# Patient Record
Sex: MALE | Race: BLACK OR AFRICAN AMERICAN | Employment: FULL TIME | URBAN - METROPOLITAN AREA
[De-identification: names, ages, dates, MRNs, and addresses within clinical notes are randomized per-mention and may not be internally consistent; named-entity substitution may affect disease eponyms.]

---

## 2017-02-24 ENCOUNTER — OFFICE VISIT (OUTPATIENT)
Dept: INTERNAL MEDICINE | Facility: CLINIC | Age: 42
End: 2017-02-24
Payer: COMMERCIAL

## 2017-02-24 VITALS
TEMPERATURE: 97.7 F | WEIGHT: 186 LBS | HEART RATE: 80 BPM | HEIGHT: 70 IN | OXYGEN SATURATION: 99 % | DIASTOLIC BLOOD PRESSURE: 80 MMHG | SYSTOLIC BLOOD PRESSURE: 116 MMHG | BODY MASS INDEX: 26.63 KG/M2

## 2017-02-24 DIAGNOSIS — E11.9 TYPE 2 DIABETES MELLITUS WITHOUT COMPLICATION, WITHOUT LONG-TERM CURRENT USE OF INSULIN (H): ICD-10-CM

## 2017-02-24 DIAGNOSIS — Z00.00 ROUTINE GENERAL MEDICAL EXAMINATION AT A HEALTH CARE FACILITY: Primary | ICD-10-CM

## 2017-02-24 LAB
ALBUMIN UR-MCNC: NEGATIVE MG/DL
APPEARANCE UR: CLEAR
BILIRUB UR QL STRIP: NEGATIVE
COLOR UR AUTO: YELLOW
ERYTHROCYTE [DISTWIDTH] IN BLOOD BY AUTOMATED COUNT: 13 % (ref 10–15)
GLUCOSE UR STRIP-MCNC: NEGATIVE MG/DL
HBA1C MFR BLD: 5.9 % (ref 4.3–6)
HCT VFR BLD AUTO: 43.1 % (ref 40–53)
HGB BLD-MCNC: 14.5 G/DL (ref 13.3–17.7)
HGB UR QL STRIP: NEGATIVE
KETONES UR STRIP-MCNC: NEGATIVE MG/DL
LEUKOCYTE ESTERASE UR QL STRIP: NEGATIVE
MCH RBC QN AUTO: 28.3 PG (ref 26.5–33)
MCHC RBC AUTO-ENTMCNC: 33.6 G/DL (ref 31.5–36.5)
MCV RBC AUTO: 84 FL (ref 78–100)
NITRATE UR QL: NEGATIVE
PH UR STRIP: 5.5 PH (ref 5–7)
PLATELET # BLD AUTO: 153 10E9/L (ref 150–450)
RBC # BLD AUTO: 5.12 10E12/L (ref 4.4–5.9)
SP GR UR STRIP: 1.02 (ref 1–1.03)
URN SPEC COLLECT METH UR: NORMAL
UROBILINOGEN UR STRIP-ACNC: 0.2 EU/DL (ref 0.2–1)
WBC # BLD AUTO: 4.9 10E9/L (ref 4–11)

## 2017-02-24 PROCEDURE — 82043 UR ALBUMIN QUANTITATIVE: CPT | Performed by: INTERNAL MEDICINE

## 2017-02-24 PROCEDURE — 83036 HEMOGLOBIN GLYCOSYLATED A1C: CPT | Performed by: INTERNAL MEDICINE

## 2017-02-24 PROCEDURE — 80061 LIPID PANEL: CPT | Performed by: INTERNAL MEDICINE

## 2017-02-24 PROCEDURE — 80053 COMPREHEN METABOLIC PANEL: CPT | Performed by: INTERNAL MEDICINE

## 2017-02-24 PROCEDURE — 81003 URINALYSIS AUTO W/O SCOPE: CPT | Performed by: INTERNAL MEDICINE

## 2017-02-24 PROCEDURE — 36415 COLL VENOUS BLD VENIPUNCTURE: CPT | Performed by: INTERNAL MEDICINE

## 2017-02-24 PROCEDURE — 99396 PREV VISIT EST AGE 40-64: CPT | Performed by: INTERNAL MEDICINE

## 2017-02-24 PROCEDURE — 84443 ASSAY THYROID STIM HORMONE: CPT | Performed by: INTERNAL MEDICINE

## 2017-02-24 PROCEDURE — G0103 PSA SCREENING: HCPCS | Performed by: INTERNAL MEDICINE

## 2017-02-24 PROCEDURE — 85027 COMPLETE CBC AUTOMATED: CPT | Performed by: INTERNAL MEDICINE

## 2017-02-24 NOTE — PROGRESS NOTES
SUBJECTIVE:     CC: Gt Fregoso is an 41 year old male who presents for preventative health visit.     Healthy Habits:    Do you get at least three servings of calcium containing foods daily (dairy, green leafy vegetables, etc.)? yes    Amount of exercise or daily activities, outside of work: walks    Problems taking medications regularly No    Medication side effects: No    Have you had an eye exam in the past two years? no    Do you see a dentist twice per year? Yes 1-2017    Do you have sleep apnea, excessive snoring or daytime drowsiness?no        PROBLEMS TO ADD ON...  Has H/O DM. On diet , exercise and PO med. Blood sugars are controlled. No parestesias. No hypoglycemias.  Has had more frequent urination for 10 days.     Today's PHQ-2 Score:   PHQ-2 ( 1999 Pfizer) 1/4/2016 7/14/2015   Q1: Little interest or pleasure in doing things 0 0   Q2: Feeling down, depressed or hopeless 0 0   PHQ-2 Score 0 0       Abuse: Current or Past(Physical, Sexual or Emotional)- No  Do you feel safe in your environment - Yes    Social History   Substance Use Topics     Smoking status: Never Smoker     Smokeless tobacco: Never Used     Alcohol use No     The patient does not drink >3 drinks per day nor >7 drinks per week.    Last PSA: No results found for: PSA    Recent Labs   Lab Test  04/24/15   1411  07/22/13   1131   CHOL  107  116   HDL  38*  30*   LDL  54  68   TRIG  74  86   CHOLHDLRATIO  2.8  3.8       Reviewed orders with patient. Reviewed health maintenance and updated orders accordingly - Yes    All Histories reviewed and updated in Epic.      ROS:  C: NEGATIVE for fever, chills, change in weight  I: NEGATIVE for worrisome rashes, moles or lesions  E: NEGATIVE for vision changes or irritation  ENT: NEGATIVE for ear, mouth and throat problems  R: NEGATIVE for significant cough or SOB  CV: NEGATIVE for chest pain, palpitations or peripheral edema  GI: NEGATIVE for nausea, abdominal pain, heartburn, or change in bowel  "habits   male: negative for dysuria, hematuria, decreased urinary stream, erectile dysfunction, urethral discharge  M: NEGATIVE for significant arthralgias or myalgia  N: NEGATIVE for weakness, dizziness or paresthesias  P: NEGATIVE for changes in mood or affect    Problem list, Medication list, Allergies, and Medical/Social/Surgical histories reviewed in [facility] and updated as appropriate.  OBJECTIVE:     /80 (BP Location: Right arm, Patient Position: Chair, Cuff Size: Adult Large)  Pulse 80  Temp 97.7  F (36.5  C) (Oral)  Ht 5' 9.5\" (1.765 m)  Wt 186 lb (84.4 kg)  SpO2 99%  BMI 27.07 kg/m2  EXAM:  GENERAL: healthy, alert and no distress  EYES: Eyes grossly normal to inspection, PERRL and conjunctivae and sclerae normal  HENT: ear canals and TM's normal, nose and mouth without ulcers or lesions  NECK: no adenopathy, no asymmetry, masses, or scars and thyroid normal to palpation  RESP: lungs clear to auscultation - no rales, rhonchi or wheezes  CV: regular rate and rhythm, normal S1 S2, no S3 or S4, no murmur, click or rub, no peripheral edema and peripheral pulses strong  ABDOMEN: soft, nontender, no hepatosplenomegaly, no masses and bowel sounds normal  MS: no gross musculoskeletal defects noted, no edema  SKIN: no suspicious lesions or rashes  NEURO: Normal strength and tone, mentation intact and speech normal  PSYCH: mentation appears normal, affect normal/bright    ASSESSMENT/PLAN:         ICD-10-CM    1. Routine general medical examination at a health care facility Z00.00 Hemoglobin A1c     Lipid panel reflex to direct LDL     Comprehensive metabolic panel     TSH with free T4 reflex     CBC with platelets     *UA reflex to Microscopic and Culture ([facility] (except [place] and [place])     Prostate spec antigen screen     Albumin Random Urine Quantitative   2. Type 2 diabetes mellitus without complication, without long-term current use of insulin (H) E11.9 " "SitaGLIPtin-MetFORMIN HCl (JANUMET XR)  MG TB24     Hemoglobin A1c     Lipid panel reflex to direct LDL     Comprehensive metabolic panel     TSH with free T4 reflex     CBC with platelets     *UA reflex to Microscopic and Culture ([facility] (except [place] and [place])     Albumin Random Urine Quantitative       COUNSELING:  Reviewed preventive health counseling, as reflected in patient instructions       Regular exercise       Healthy diet/nutrition       Vision screening       Hearing screening       Colon cancer screening       Prostate cancer screening         reports that he has never smoked. He has never used smokeless tobacco.    Estimated body mass index is 27.07 kg/(m^2) as calculated from the following:    Height as of this encounter: 5' 9.5\" (1.765 m).    Weight as of this encounter: 186 lb (84.4 kg).       Counseling Resources:  ATP IV Guidelines  Pooled Cohorts Equation Calculator  FRAX Risk Assessment  ICSI Preventive Guidelines  Dietary Guidelines for Americans, 2010  USDA's MyPlate  ASA Prophylaxis  Lung CA Screening    El Simmons MD  [facility]  "

## 2017-02-24 NOTE — NURSING NOTE
"Chief Complaint   Patient presents with     Physical     fasting       Initial /80 (BP Location: Right arm, Patient Position: Chair, Cuff Size: Adult Large)  Pulse 80  Temp 97.7  F (36.5  C) (Oral)  Ht 5' 9.5\" (1.765 m)  Wt 186 lb (84.4 kg)  SpO2 99%  BMI 27.07 kg/m2 Estimated body mass index is 27.07 kg/(m^2) as calculated from the following:    Height as of this encounter: 5' 9.5\" (1.765 m).    Weight as of this encounter: 186 lb (84.4 kg).  Medication Reconciliation: complete    "

## 2017-02-24 NOTE — MR AVS SNAPSHOT
After Visit Summary   2/24/2017 Sunday NARESH Fregoso    MRN: [id number]           Patient Information     Date Of Birth          1975        Visit Information        Provider Department      2/24/2017 10:00 AM El Simmons MD [facility]        Today's Diagnoses     Routine general medical examination at a health care facility    -  1    Type 2 diabetes mellitus without complication, without long-term current use of insulin (H)          Care Instructions      Preventive Health Recommendations  Male Ages 40 to 49    Yearly exam:             See your health care provider every year in order to  o   Review health changes.   o   Discuss preventive care.    o   Review your medicines if your doctor has prescribed any.    You should be tested each year for STDs (sexually transmitted diseases) if you re at risk.     Have a cholesterol test every 5 years.     Have a colonoscopy (test for colon cancer) if someone in your family has had colon cancer or polyps before age 50.     After age 45, have a diabetes test (fasting glucose). If you are at risk for diabetes, you should have this test every 3 years.      Talk with your health care provider about whether or not a prostate cancer screening test (PSA) is right for you.    Shots: Get a flu shot each year. Get a tetanus shot every 10 years.     Nutrition:    Eat at least 5 servings of fruits and vegetables daily.     Eat whole-grain bread, whole-wheat pasta and brown rice instead of white grains and rice.     Talk to your provider about Calcium and Vitamin D.     Lifestyle    Exercise for at least 150 minutes a week (30 minutes a day, 5 days a week). This will help you control your weight and prevent disease.     Limit alcohol to one drink per day.     No smoking.     Wear sunscreen to prevent skin cancer.     See your dentist every six months for an exam and cleaning.            Follow-ups after your visit        Who to contact     If  "you have questions or need follow up information about today's clinic visit or your schedule please contact [facility] directly at [phone].  Normal or non-critical lab and imaging results will be communicated to you by MyChart, letter or phone within 4 business days after the clinic has received the results. If you do not hear from us within 7 days, please contact the clinic through [employer] or phone. If you have a critical or abnormal lab result, we will notify you by phone as soon as possible.  Submit refill requests through [email or web address] or call your pharmacy and they will forward the refill request to us. Please allow 3 business days for your refill to be completed.          Additional Information About Your Visit        [employer] Information     [email or web address] lets you send messages to your doctor, view your test results, renew your prescriptions, schedule appointments and more. To sign up, go to [email or web address] . Click on \"Log in\" on the left side of the screen, which will take you to the Welcome page. Then click on \"Sign up Now\" on the right side of the page.     You will be asked to enter the access code listed below, as well as some personal information. Please follow the directions to create your username and password.     Your access code is: [email or web address]  Expires: 2017 10:23 AM     Your access code will  in 90 days. If you need help or a new code, please call your [place] clinic or [phone].        Care EveryWhere ID     This is your Care EveryWhere ID. This could be used by other organizations to access your [place] medical records  [id number]        Your Vitals Were     Pulse Temperature Height Pulse Oximetry BMI (Body Mass Index)       80 97.7  F (36.5  C) (Oral) 5' 9.5\" (1.765 m) 99% 27.07 kg/m2        Blood Pressure from Last 3 Encounters:   17 116/80   16 124/60   07/14/15 118/72    Weight from Last 3 Encounters:   17 186 lb (84.4 kg)   16 " 195 lb (88.5 kg)   07/14/15 190 lb 9.6 oz (86.5 kg)              We Performed the Following     *UA reflex to Microscopic and Culture ([facility] and [facility] (except [place] and [place])     Albumin Random Urine Quantitative     CBC with platelets     Comprehensive metabolic panel     Hemoglobin A1c     Lipid panel reflex to direct LDL     Prostate spec antigen screen     TSH with free T4 reflex          Where to get your medicines      These medications were sent to [facility] [place], [place] - [address]  [address] [facility] [address]     Phone:  [phone]     SitaGLIPtin-MetFORMIN HCl  MG Tb24          Primary Care Provider Office Phone # Fax #    El Simmons [phone]       [facility] [address] [facility] [address]        Thank you!     Thank you for choosing [facility]  for your care. Our goal is always to provide you with excellent care. Hearing back from our patients is one way we can continue to improve our services. Please take a few minutes to complete the written survey that you may receive in the mail after your visit with us. Thank you!             Your Updated Medication List - Protect others around you: Learn how to safely use, store and throw away your medicines at www.disposemymeds.org.          This list is accurate as of: 2/24/17 10:23 AM.  Always use your most recent med list.                   Brand Name Dispense Instructions for use    * ACE NOT PRESCRIBED (INTENTIONAL)     0 each    1 each daily ACE Inhibitor not prescribed due to Other:not needed       * ASPIRIN NOT PRESCRIBED    INTENTIONAL    0 each    1 each continuous prn. Antiplatelet medication not prescribed intentionally due to Intolerance and GI Issues / Ulcer Disease       SitaGLIPtin-MetFORMIN HCl  MG Tb24    JANUMET XR    180 tablet    Take 1 tablet by mouth 2 times daily       * STATIN NOT  PRESCRIBED (INTENTIONAL)     0 each    Statin not prescribed intentionally due to Other:not needed       * Notice:  This list has 3 medication(s) that are the same as other medications prescribed for you. Read the directions carefully, and ask your doctor or other care provider to review them with you.

## 2017-02-25 LAB
ALBUMIN SERPL-MCNC: 4.4 G/DL (ref 3.4–5)
ALP SERPL-CCNC: 53 U/L (ref 40–150)
ALT SERPL W P-5'-P-CCNC: 35 U/L (ref 0–70)
ANION GAP SERPL CALCULATED.3IONS-SCNC: 4 MMOL/L (ref 3–14)
AST SERPL W P-5'-P-CCNC: 25 U/L (ref 0–45)
BILIRUB SERPL-MCNC: 0.6 MG/DL (ref 0.2–1.3)
BUN SERPL-MCNC: 16 MG/DL (ref 7–30)
CALCIUM SERPL-MCNC: 9.5 MG/DL (ref 8.5–10.1)
CHLORIDE SERPL-SCNC: 104 MMOL/L (ref 94–109)
CHOLEST SERPL-MCNC: 149 MG/DL
CO2 SERPL-SCNC: 33 MMOL/L (ref 20–32)
CREAT SERPL-MCNC: 0.96 MG/DL (ref 0.66–1.25)
CREAT UR-MCNC: 163 MG/DL
GFR SERPL CREATININE-BSD FRML MDRD: 86 ML/MIN/1.7M2
GLUCOSE SERPL-MCNC: 109 MG/DL (ref 70–99)
HDLC SERPL-MCNC: 40 MG/DL
LDLC SERPL CALC-MCNC: 89 MG/DL
MICROALBUMIN UR-MCNC: 5 MG/L
MICROALBUMIN/CREAT UR: 3.32 MG/G CR (ref 0–17)
NONHDLC SERPL-MCNC: 109 MG/DL
POTASSIUM SERPL-SCNC: 4.3 MMOL/L (ref 3.4–5.3)
PROT SERPL-MCNC: 8.1 G/DL (ref 6.8–8.8)
PSA SERPL-ACNC: 2.48 UG/L (ref 0–4)
SODIUM SERPL-SCNC: 141 MMOL/L (ref 133–144)
TRIGL SERPL-MCNC: 100 MG/DL
TSH SERPL DL<=0.005 MIU/L-ACNC: 2.94 MU/L (ref 0.4–4)

## 2017-03-24 ENCOUNTER — OFFICE VISIT (OUTPATIENT)
Dept: INTERNAL MEDICINE | Facility: CLINIC | Age: 42
End: 2017-03-24
Payer: COMMERCIAL

## 2017-03-24 VITALS
TEMPERATURE: 97.9 F | HEART RATE: 73 BPM | BODY MASS INDEX: 26.5 KG/M2 | HEIGHT: 70 IN | WEIGHT: 185.1 LBS | DIASTOLIC BLOOD PRESSURE: 78 MMHG | SYSTOLIC BLOOD PRESSURE: 118 MMHG | RESPIRATION RATE: 16 BRPM | OXYGEN SATURATION: 99 %

## 2017-03-24 DIAGNOSIS — L30.9 DERMATITIS: Primary | ICD-10-CM

## 2017-03-24 PROCEDURE — 99213 OFFICE O/P EST LOW 20 MIN: CPT | Performed by: INTERNAL MEDICINE

## 2017-03-24 RX ORDER — TRIAMCINOLONE ACETONIDE 1 MG/G
CREAM TOPICAL
Qty: 15 G | Refills: 3 | Status: SHIPPED | OUTPATIENT
Start: 2017-03-24 | End: 2018-10-24

## 2017-03-24 NOTE — NURSING NOTE
"Chief Complaint   Patient presents with     Fungal Infection     Left big toe- scaly, itchy, on and off for 6 months, last episode lasting 1 month. Used OTC cream's not effective.        Initial /78  Pulse 73  Temp 97.9  F (36.6  C) (Oral)  Resp 16  Ht 5' 9.5\" (1.765 m)  Wt 185 lb 1.6 oz (84 kg)  SpO2 99%  BMI 26.94 kg/m2 Estimated body mass index is 26.94 kg/(m^2) as calculated from the following:    Height as of this encounter: 5' 9.5\" (1.765 m).    Weight as of this encounter: 185 lb 1.6 oz (84 kg).  Medication Reconciliation: complete      Racquel Dinero CMA      "

## 2017-03-24 NOTE — MR AVS SNAPSHOT
"              After Visit Summary   3/24/2017    Gt NARESH Fregoso    MRN: [id number]           Patient Information     Date Of Birth          1975        Visit Information        Provider Department      3/24/2017 11:00 AM Caitlyn Gonzalez MD [facility]        Today's Diagnoses     Dermatitis    -  1       Follow-ups after your visit        Who to contact     If you have questions or need follow up information about today's clinic visit or your schedule please contact [facility] directly at [phone].  Normal or non-critical lab and imaging results will be communicated to you by MyChart, letter or phone within 4 business days after the clinic has received the results. If you do not hear from us within 7 days, please contact the clinic through [employer] or phone. If you have a critical or abnormal lab result, we will notify you by phone as soon as possible.  Submit refill requests through [employer] or call your pharmacy and they will forward the refill request to us. Please allow 3 business days for your refill to be completed.          Additional Information About Your Visit        MyChart Information     [employer] lets you send messages to your doctor, view your test results, renew your prescriptions, schedule appointments and more. To sign up, go to [email or web address] . Click on \"Log in\" on the left side of the screen, which will take you to the Welcome page. Then click on \"Sign up Now\" on the right side of the page.     You will be asked to enter the access code listed below, as well as some personal information. Please follow the directions to create your username and password.     Your access code is: [email or web address]  Expires: 2017 11:23 AM     Your access code will  in 90 days. If you need help or a new code, please call your [facility] or [phone].        Care EveryWhere ID     This is your Care EveryWhere ID. This could be used by other organizations to " "access your [place] medical records  [id number]        Your Vitals Were     Pulse Temperature Respirations Height Pulse Oximetry BMI (Body Mass Index)    73 97.9  F (36.6  C) (Oral) 16 5' 9.5\" (1.765 m) 99% 26.94 kg/m2       Blood Pressure from Last 3 Encounters:   03/24/17 118/78   02/24/17 116/80   01/04/16 124/60    Weight from Last 3 Encounters:   03/24/17 185 lb 1.6 oz (84 kg)   02/24/17 186 lb (84.4 kg)   01/04/16 195 lb (88.5 kg)              Today, you had the following     No orders found for display         Today's Medication Changes          These changes are accurate as of: 3/24/17  3:16 PM.  If you have any questions, ask your nurse or doctor.               Start taking these medicines.        Dose/Directions    triamcinolone 0.1 % cream   Commonly known as:  KENALOG   Used for:  Dermatitis   Started by:  Caitlyn Gonzalez MD        Apply sparingly to affected area bid as needed   Quantity:  15 g   Refills:  3            Where to get your medicines      These medications were sent to [facility] [place], [place] - [facility].  [facility] [address] [facility] [id number]     Phone:  [phone]     triamcinolone 0.1 % cream                Primary Care Provider Office Phone # Fax #    El Simmons [phone]       [place] [address] [facility] [address]        Thank you!     Thank you for choosing [facility]  for your care. Our goal is always to provide you with excellent care. Hearing back from our patients is one way we can continue to improve our services. Please take a few minutes to complete the written survey that you may receive in the mail after your visit with us. Thank you!             Your Updated Medication List - Protect others around you: Learn how to safely use, store and throw away your medicines at www.disposemymeds.org.          This list is accurate as of: 3/24/17  3:16 PM.  Always use your most " recent med list.                   Brand Name Dispense Instructions for use    * ACE NOT PRESCRIBED (INTENTIONAL)     0 each    1 each daily ACE Inhibitor not prescribed due to Other:not needed       * ASPIRIN NOT PRESCRIBED    INTENTIONAL    0 each    1 each continuous prn. Antiplatelet medication not prescribed intentionally due to Intolerance and GI Issues / Ulcer Disease       SitaGLIPtin-MetFORMIN HCl  MG Tb24    JANUMET XR    180 tablet    Take 1 tablet by mouth 2 times daily       * STATIN NOT PRESCRIBED (INTENTIONAL)     0 each    Statin not prescribed intentionally due to Other:not needed       triamcinolone 0.1 % cream    KENALOG    15 g    Apply sparingly to affected area bid as needed       * Notice:  This list has 3 medication(s) that are the same as other medications prescribed for you. Read the directions carefully, and ask your doctor or other care provider to review them with you.

## 2017-03-24 NOTE — PROGRESS NOTES
"    SUBJECTIVE:                                                    Gt Fregoso is a 41 year old male who presents to clinic today for the following health issues:      Rash with itching, mild soreness left great toe medially. It is along the nail. There has been no swelling or drainage. It is not really painful. He has tried several fungal creams without relief.       Patient Active Problem List   Diagnosis     PUD (Peptic Ulcer Disease)-hx of     GERD (gastroesophageal reflux disease)     Helicobacter pylori infection     Positive PPD     Anemia     CARDIOVASCULAR SCREENING; LDL GOAL LESS THAN 160     Type 2 diabetes mellitus without complication (H)     Current Outpatient Prescriptions   Medication Sig Dispense Refill     SitaGLIPtin-MetFORMIN HCl (JANUMET XR)  MG TB24 Take 1 tablet by mouth 2 times daily 180 tablet 3     ACE NOT PRESCRIBED, INTENTIONAL, 1 each daily ACE Inhibitor not prescribed due to Other:not needed 0 each 0     STATIN NOT PRESCRIBED, INTENTIONAL, Statin not prescribed intentionally due to Other:not needed 0 each 0     ASPIRIN NOT PRESCRIBED, INTENTIONAL, 1 each continuous prn. Antiplatelet medication not prescribed intentionally due to Intolerance and GI Issues / Ulcer Disease 0 each 0        Reviewed and updated as needed this visit by clinical staff  Tobacco  Allergies  Meds  Med Hx  Surg Hx  Fam Hx  Soc Hx      Reviewed and updated as needed this visit by Provider         ROS:  negative    OBJECTIVE:                                                    /78  Pulse 73  Temp 97.9  F (36.6  C) (Oral)  Resp 16  Ht 5' 9.5\" (1.765 m)  Wt 185 lb 1.6 oz (84 kg)  SpO2 99%  BMI 26.94 kg/m2  Body mass index is 26.94 kg/(m^2).    There is some scaling and mild erythema medial distal left great toe.   No other rash between toes       ASSESSMENT/PLAN:                                                            1. Dermatitis  No evidence of infection, failed appropriate trial of " antifungal so more likely dermatitis. Trial TMC, refer derm if worsening.   - triamcinolone (KENALOG) 0.1 % cream; Apply sparingly to affected area bid as needed  Dispense: 15 g; Refill: 3        Caitlyn Gonzalez MD  [facility]

## 2017-08-04 ENCOUNTER — TELEPHONE (OUTPATIENT)
Dept: INTERNAL MEDICINE | Facility: CLINIC | Age: 42
End: 2017-08-04

## 2017-08-04 DIAGNOSIS — E11.9 TYPE 2 DIABETES MELLITUS WITHOUT COMPLICATION, WITHOUT LONG-TERM CURRENT USE OF INSULIN (H): Primary | ICD-10-CM

## 2017-08-04 RX ORDER — METFORMIN HCL 500 MG
1000 TABLET, EXTENDED RELEASE 24 HR ORAL
Qty: 180 TABLET | Refills: 1 | Status: SHIPPED | OUTPATIENT
Start: 2017-08-04 | End: 2018-01-05

## 2017-08-04 NOTE — TELEPHONE ENCOUNTER
Call to pt. He states he will NEVER go back to Metformin alone.   He states the metformin combined with the other medication works to control his BS.     He wants to wait for the PA.     Informed him that this will be started on Monday. He agrees.     Dr Simmons, are there any other alternatives prior to starting PA?

## 2017-08-04 NOTE — TELEPHONE ENCOUNTER
Janumet xr 50-500mg needs a prior auth, prerequisite therapy required. Please call [phone]. The insurance is [employer]) and his ID# IS [id number]. We advanced him 4 days worth as he was out of medicine. Thanks Chinyere on Behalf of [facility].

## 2017-08-04 NOTE — TELEPHONE ENCOUNTER
He can try Metformin alone without the Januvia. Will call in.,   Needs recheck in 3 months of his HgbA1c.

## 2017-08-07 NOTE — TELEPHONE ENCOUNTER
Call to below number. Transferred to [employer] at [phone]. Transferred to mail order pharmacy. They are not able to help me. Call to below number again. PA was already completed earlier today. PA was approved through 8/7/18. Call to pharmacy. Updated.

## 2017-08-07 NOTE — TELEPHONE ENCOUNTER
Pt has current rx at pharmacy. Pharmacy is aware PA was approved. New order entered as historical so med list is up to date.

## 2017-08-07 NOTE — TELEPHONE ENCOUNTER
Called to insurance, PA done over the phone for Janumet Xr  and was approved. Insurance will send a fax confirming but stating to give and hour to update Pt's insurance before running Rx through pharmacy. Will call Pt and pharmacy to inform of approval. Please, send in new script.   Yadira Bravo MA

## 2018-01-05 ENCOUNTER — OFFICE VISIT (OUTPATIENT)
Dept: INTERNAL MEDICINE | Facility: CLINIC | Age: 43
End: 2018-01-05
Payer: COMMERCIAL

## 2018-01-05 VITALS
TEMPERATURE: 98.1 F | DIASTOLIC BLOOD PRESSURE: 78 MMHG | OXYGEN SATURATION: 99 % | HEIGHT: 70 IN | BODY MASS INDEX: 27.49 KG/M2 | WEIGHT: 192 LBS | SYSTOLIC BLOOD PRESSURE: 120 MMHG | HEART RATE: 79 BPM

## 2018-01-05 DIAGNOSIS — E11.9 TYPE 2 DIABETES MELLITUS WITHOUT COMPLICATION, WITHOUT LONG-TERM CURRENT USE OF INSULIN (H): Primary | ICD-10-CM

## 2018-01-05 DIAGNOSIS — Z00.00 ROUTINE GENERAL MEDICAL EXAMINATION AT A HEALTH CARE FACILITY: ICD-10-CM

## 2018-01-05 DIAGNOSIS — Z11.1 SCREENING EXAMINATION FOR PULMONARY TUBERCULOSIS: ICD-10-CM

## 2018-01-05 DIAGNOSIS — K21.9 GASTROESOPHAGEAL REFLUX DISEASE WITHOUT ESOPHAGITIS: ICD-10-CM

## 2018-01-05 LAB
ERYTHROCYTE [DISTWIDTH] IN BLOOD BY AUTOMATED COUNT: 12.6 % (ref 10–15)
HBA1C MFR BLD: 5.2 % (ref 4.3–6)
HCT VFR BLD AUTO: 43.4 % (ref 40–53)
HGB BLD-MCNC: 14.5 G/DL (ref 13.3–17.7)
MCH RBC QN AUTO: 28.6 PG (ref 26.5–33)
MCHC RBC AUTO-ENTMCNC: 33.4 G/DL (ref 31.5–36.5)
MCV RBC AUTO: 86 FL (ref 78–100)
PLATELET # BLD AUTO: 139 10E9/L (ref 150–450)
RBC # BLD AUTO: 5.07 10E12/L (ref 4.4–5.9)
WBC # BLD AUTO: 4 10E9/L (ref 4–11)

## 2018-01-05 PROCEDURE — 86480 TB TEST CELL IMMUN MEASURE: CPT | Performed by: INTERNAL MEDICINE

## 2018-01-05 PROCEDURE — 84443 ASSAY THYROID STIM HORMONE: CPT | Performed by: INTERNAL MEDICINE

## 2018-01-05 PROCEDURE — 83036 HEMOGLOBIN GLYCOSYLATED A1C: CPT | Performed by: INTERNAL MEDICINE

## 2018-01-05 PROCEDURE — 80061 LIPID PANEL: CPT | Performed by: INTERNAL MEDICINE

## 2018-01-05 PROCEDURE — 99214 OFFICE O/P EST MOD 30 MIN: CPT | Performed by: INTERNAL MEDICINE

## 2018-01-05 PROCEDURE — 36415 COLL VENOUS BLD VENIPUNCTURE: CPT | Performed by: INTERNAL MEDICINE

## 2018-01-05 PROCEDURE — 82043 UR ALBUMIN QUANTITATIVE: CPT | Performed by: INTERNAL MEDICINE

## 2018-01-05 PROCEDURE — 80053 COMPREHEN METABOLIC PANEL: CPT | Performed by: INTERNAL MEDICINE

## 2018-01-05 PROCEDURE — 85027 COMPLETE CBC AUTOMATED: CPT | Performed by: INTERNAL MEDICINE

## 2018-01-05 RX ORDER — METFORMIN HCL 500 MG
1000 TABLET, EXTENDED RELEASE 24 HR ORAL
Qty: 180 TABLET | Refills: 1 | Status: CANCELLED | OUTPATIENT
Start: 2018-01-05

## 2018-01-05 NOTE — LETTER
[facility]  [address], [address]  [place] [address]  [phone]        January 9, 2018 Sunday NARESH Fregoso  [address] [facility] [address]            Dear  Gtmary lou Fregoso:      The results of your recent labs were NORMAL.  If you have any further questions or problems, please contact our office.    Sincerely,        El Simmons M.D.

## 2018-01-05 NOTE — MR AVS SNAPSHOT
After Visit Summary   1/5/2018    Gt Fregoso    MRN: [id number]           Patient Information     Date Of Birth          1975        Visit Information        Provider Department      1/5/2018 11:00 AM El Simmons MD [facility]        Today's Diagnoses     Type 2 diabetes mellitus without complication, without long-term current use of insulin (H)    -  1    Screening examination for pulmonary tuberculosis        Routine general medical examination at a health care facility           Follow-ups after your visit        Additional Services     OPHTHALMOLOGY ADULT REFERRAL       Your provider has referred you to: N: [facility]  [phone]  [email or web address]    Please be aware that coverage of these services is subject to the terms and limitations of your health insurance plan.  Call member services at your health plan with any benefit or coverage questions.      Please bring the following with you to your appointment:    (1) Any X-Rays, CTs or MRIs which have been performed.  Contact the facility where they were done to arrange for  prior to your scheduled appointment.    (2) List of current medications  (3) This referral request   (4) Any documents/labs given to you for this referral                  Who to contact     If you have questions or need follow up information about today's clinic visit or your schedule please contact [facility] directly at [phone].  Normal or non-critical lab and imaging results will be communicated to you by MyChart, letter or phone within 4 business days after the clinic has received the results. If you do not hear from us within 7 days, please contact the clinic through MyChart or phone. If you have a critical or abnormal lab result, we will notify you by phone as soon as possible.  Submit refill requests through [employer] or call your pharmacy and they will  "forward the refill request to us. Please allow 3 business days for your refill to be completed.          Additional Information About Your Visit        [employer] Information     [employer] lets you send messages to your doctor, view your test results, renew your prescriptions, schedule appointments and more. To sign up, go to [email or web address] . Click on \"Log in\" on the left side of the screen, which will take you to the Welcome page. Then click on \"Sign up Now\" on the right side of the page.     You will be asked to enter the access code listed below, as well as some personal information. Please follow the directions to create your username and password.     Your access code is: [email or web address]  Expires: 2018 11:43 AM     Your access code will  in 90 days. If you need help or a new code, please call your [place] clinic or [phone].        Care EveryWhere ID     This is your Care EveryWhere ID. This could be used by other organizations to access your [place] medical records  [id number]        Your Vitals Were     Pulse Temperature Height Pulse Oximetry BMI (Body Mass Index)       79 98.1  F (36.7  C) (Oral) 5' 9.5\" (1.765 m) 99% 27.95 kg/m2        Blood Pressure from Last 3 Encounters:   18 120/78   17 118/78   17 116/80    Weight from Last 3 Encounters:   18 192 lb (87.1 kg)   17 185 lb 1.6 oz (84 kg)   17 186 lb (84.4 kg)              We Performed the Following     Albumin Random Urine Quantitative with Creat Ratio     CBC with platelets     Comprehensive metabolic panel     Hemoglobin A1c     Lipid panel reflex to direct LDL Fasting     M Tuberculosis by Quantiferon     OPHTHALMOLOGY ADULT REFERRAL     TSH with free T4 reflex          Where to get your medicines      These medications were sent to [facility] [address] [place] [employer] - [facility] [id number] [facility], [facility] [place] [address]     Phone:  [phone]     SitaGLIPtin-MetFORMIN HCl "  MG Tb24          Primary Care Provider Office Phone # Fax #    El Simmons [phone]       [address] [facility] [address]        Equal Access to Services     JONATHON FLOOD : Claudio dubon emmanuelle kongo Soadolfoali, waaxda luqadaha, qaybta kaalmada adeegyada, kris zhoun malick barrett laDayannalucio gaona. So [facility] [phone].    ATENCIÓN: Si habla español, tiene a barrios disposición servicios gratuitos de asistencia lingüística. Llame al [phone].    We comply with applicable federal civil rights laws and [place] laws. We do not discriminate on the basis of race, color, national origin, age, disability, sex, sexual orientation, or gender identity.            Thank you!     Thank you for choosing [facility]  for your care. Our goal is always to provide you with excellent care. Hearing back from our patients is one way we can continue to improve our services. Please take a few minutes to complete the written survey that you may receive in the mail after your visit with us. Thank you!             Your Updated Medication List - Protect others around you: Learn how to safely use, store and throw away your medicines at www.disposemymeds.org.          This list is accurate as of: 1/5/18 11:43 AM.  Always use your most recent med list.                   Brand Name Dispense Instructions for use Diagnosis    * ACE NOT PRESCRIBED (INTENTIONAL)     0 each    1 each daily ACE Inhibitor not prescribed due to Other:not needed    Diabetes mellitus, type 2 (H)       * ASPIRIN NOT PRESCRIBED    INTENTIONAL    0 each    1 each continuous prn. Antiplatelet medication not prescribed intentionally due to Intolerance and GI Issues / Ulcer Disease    Type 2 diabetes, HbA1C goal < 8% (H)       metFORMIN 500 MG 24 hr tablet    GLUCOPHAGE-XR    180 tablet    Take 2 tablets (1,000 mg) by mouth daily (with dinner)    Type 2 diabetes mellitus without complication, without long-term current use of  insulin (H)       SitaGLIPtin-MetFORMIN HCl  MG Tb24    JANUMET XR    180 tablet    Take 1 tablet by mouth 2 times daily    Type 2 diabetes mellitus without complication, without long-term current use of insulin (H)       * STATIN NOT PRESCRIBED (INTENTIONAL)     0 each    Statin not prescribed intentionally due to Other:not needed        triamcinolone 0.1 % cream    KENALOG    15 g    Apply sparingly to affected area bid as needed    Dermatitis       * Notice:  This list has 3 medication(s) that are the same as other medications prescribed for you. Read the directions carefully, and ask your doctor or other care provider to review them with you.

## 2018-01-05 NOTE — NURSING NOTE
"Chief Complaint   Patient presents with     Diabetes     F/U       Initial /78  Pulse 79  Temp 98.1  F (36.7  C) (Oral)  Ht 5' 9.5\" (1.765 m)  Wt 192 lb (87.1 kg)  SpO2 99%  BMI 27.95 kg/m2 Estimated body mass index is 27.95 kg/(m^2) as calculated from the following:    Height as of this encounter: 5' 9.5\" (1.765 m).    Weight as of this encounter: 192 lb (87.1 kg).  Medication Reconciliation: complete   Yadira Bravo MA      "

## 2018-01-05 NOTE — PROGRESS NOTES
SUBJECTIVE:   Gt Fregoso is a 42 year old male who presents to clinic today for the following health issues:      Diabetes Follow-up    Patient is checking blood sugars: twice daily.    Results are as follows:      AM and evenin-99        Diabetic concerns: None     Symptoms of hypoglycemia (low blood sugar): none     Paresthesias (numbness or burning in feet) or sores: No     Date of last diabetic eye exam: overdue  BP Readings from Last 2 Encounters:   17 118/78   17 116/80     Hemoglobin A1C (%)   Date Value   2017 5.9   2016 5.9     LDL Cholesterol Calculated (mg/dL)   Date Value   2017 89   2015 54   Has H/O DM. On diet , exercise and PO medications. Blood sugars are controlled. No parestesias. No hypoglycemias.  Has h/o GERD on diet. symptoms are controlled. No nausea, vomiting, heartburns, bloating.  Discussed preventive measures.   Has h/o BCG with positive PPD. Has had some mild night sweats. No cough, no weight loss.       Amount of exercise or physical activity: 3 times a week    Problems taking medications regularly: No    Medication side effects: none    Diet: diabetic and carbohydrate counting            Problem list and histories reviewed & adjusted, as indicated.  Additional history: as documented    Patient Active Problem List   Diagnosis     PUD (Peptic Ulcer Disease)-hx of     GERD (gastroesophageal reflux disease)     Helicobacter pylori infection     Positive PPD     Anemia     CARDIOVASCULAR SCREENING; LDL GOAL LESS THAN 160     Type 2 diabetes mellitus without complication (H)     Past Surgical History:   Procedure Laterality Date     C NONSPECIFIC PROCEDURE      none     COLONOSCOPY  2009       Social History   Substance Use Topics     Smoking status: Never Smoker     Smokeless tobacco: Never Used     Alcohol use No     Family History   Problem Relation Age of Onset     DIABETES Mother      Hypertension Father          Current Outpatient  "Prescriptions   Medication Sig Dispense Refill     SitaGLIPtin-MetFORMIN HCl (JANUMET XR)  MG TB24 Take 1 tablet by mouth 2 times daily 180 tablet 3     triamcinolone (KENALOG) 0.1 % cream Apply sparingly to affected area bid as needed 15 g 3     [DISCONTINUED] SitaGLIPtin-MetFORMIN HCl (JANUMET XR)  MG TB24 Take 1 tablet by mouth 2 times daily 180 tablet 3     ACE NOT PRESCRIBED, INTENTIONAL, 1 each daily ACE Inhibitor not prescribed due to Other:not needed 0 each 0     STATIN NOT PRESCRIBED, INTENTIONAL, Statin not prescribed intentionally due to Other:not needed 0 each 0     ASPIRIN NOT PRESCRIBED, INTENTIONAL, 1 each continuous prn. Antiplatelet medication not prescribed intentionally due to Intolerance and GI Issues / Ulcer Disease 0 each 0     No Known Allergies      Reviewed and updated as needed this visit by clinical staff     Reviewed and updated as needed this visit by Provider         ROS:  Constitutional, HEENT, cardiovascular, pulmonary, gi and gu systems are negative, except as otherwise noted.      OBJECTIVE:   /78  Pulse 79  Temp 98.1  F (36.7  C) (Oral)  Ht 5' 9.5\" (1.765 m)  Wt 192 lb (87.1 kg)  SpO2 99%  BMI 27.95 kg/m2  Body mass index is 27.95 kg/(m^2).   GENERAL: healthy, alert and no distress  NECK: no adenopathy, no asymmetry, masses, or scars and thyroid normal to palpation  RESP: lungs clear to auscultation - no rales, rhonchi or wheezes  CV: regular rate and rhythm, normal S1 S2, no S3 or S4, no murmur, click or rub, no peripheral edema and peripheral pulses strong  ABDOMEN: soft, nontender, no hepatosplenomegaly, no masses and bowel sounds normal  MS: no gross musculoskeletal defects noted, no edema    Diagnostic Test Results:  none     ASSESSMENT/PLAN:     Problem List Items Addressed This Visit     GERD (gastroesophageal reflux disease)    Type 2 diabetes mellitus without complication (H) - Primary    Relevant Medications    SitaGLIPtin-MetFORMIN HCl (JANUMET " XR)  MG TB24    Other Relevant Orders    OPHTHALMOLOGY ADULT REFERRAL    CBC with platelets (Completed)    Comprehensive metabolic panel (Completed)    Lipid panel reflex to direct LDL Fasting (Completed)    Hemoglobin A1c (Completed)    TSH with free T4 reflex (Completed)    Albumin Random Urine Quantitative with Creat Ratio (Completed)      Other Visit Diagnoses     Screening examination for pulmonary tuberculosis        Relevant Orders    M Tuberculosis by Quantiferon (Completed)    Routine general medical examination at a health care facility        Relevant Orders    CBC with platelets (Completed)    Comprehensive metabolic panel (Completed)    Lipid panel reflex to direct LDL Fasting (Completed)    Hemoglobin A1c (Completed)    TSH with free T4 reflex (Completed)    Albumin Random Urine Quantitative with Creat Ratio (Completed)           Assess DM control   Cont treatment   Controlled GERD   Assess for TB latent     Follow-Up:with results     El Simmons MD  [facility]  '2

## 2018-01-06 LAB
ALBUMIN SERPL-MCNC: 4.4 G/DL (ref 3.4–5)
ALP SERPL-CCNC: 45 U/L (ref 40–150)
ALT SERPL W P-5'-P-CCNC: 34 U/L (ref 0–70)
ANION GAP SERPL CALCULATED.3IONS-SCNC: 4 MMOL/L (ref 3–14)
AST SERPL W P-5'-P-CCNC: 30 U/L (ref 0–45)
BILIRUB SERPL-MCNC: 0.3 MG/DL (ref 0.2–1.3)
BUN SERPL-MCNC: 13 MG/DL (ref 7–30)
CALCIUM SERPL-MCNC: 9.2 MG/DL (ref 8.5–10.1)
CHLORIDE SERPL-SCNC: 106 MMOL/L (ref 94–109)
CHOLEST SERPL-MCNC: 127 MG/DL
CO2 SERPL-SCNC: 32 MMOL/L (ref 20–32)
CREAT SERPL-MCNC: 1 MG/DL (ref 0.66–1.25)
CREAT UR-MCNC: 141 MG/DL
GFR SERPL CREATININE-BSD FRML MDRD: 82 ML/MIN/1.7M2
GLUCOSE SERPL-MCNC: 81 MG/DL (ref 70–99)
HDLC SERPL-MCNC: 39 MG/DL
LDLC SERPL CALC-MCNC: 74 MG/DL
MICROALBUMIN UR-MCNC: <5 MG/L
MICROALBUMIN/CREAT UR: NORMAL MG/G CR (ref 0–17)
NONHDLC SERPL-MCNC: 88 MG/DL
POTASSIUM SERPL-SCNC: 4.1 MMOL/L (ref 3.4–5.3)
PROT SERPL-MCNC: 7.7 G/DL (ref 6.8–8.8)
SODIUM SERPL-SCNC: 142 MMOL/L (ref 133–144)
TRIGL SERPL-MCNC: 68 MG/DL
TSH SERPL DL<=0.005 MIU/L-ACNC: 2.68 MU/L (ref 0.4–4)

## 2018-01-08 LAB
M TB TUBERC IFN-G BLD QL: NEGATIVE
M TB TUBERC IFN-G/MITOGEN IGNF BLD: 0.32 IU/ML

## 2018-10-24 ENCOUNTER — OFFICE VISIT (OUTPATIENT)
Dept: INTERNAL MEDICINE | Facility: CLINIC | Age: 43
End: 2018-10-24
Payer: COMMERCIAL

## 2018-10-24 VITALS
DIASTOLIC BLOOD PRESSURE: 66 MMHG | WEIGHT: 179 LBS | TEMPERATURE: 98.3 F | HEART RATE: 68 BPM | BODY MASS INDEX: 25.62 KG/M2 | SYSTOLIC BLOOD PRESSURE: 116 MMHG | HEIGHT: 70 IN | RESPIRATION RATE: 18 BRPM | OXYGEN SATURATION: 99 %

## 2018-10-24 DIAGNOSIS — E11.9 TYPE 2 DIABETES MELLITUS WITHOUT COMPLICATION, WITHOUT LONG-TERM CURRENT USE OF INSULIN (H): ICD-10-CM

## 2018-10-24 LAB — HBA1C MFR BLD: 4.5 % (ref 0–5.6)

## 2018-10-24 PROCEDURE — 83036 HEMOGLOBIN GLYCOSYLATED A1C: CPT | Performed by: INTERNAL MEDICINE

## 2018-10-24 PROCEDURE — 80053 COMPREHEN METABOLIC PANEL: CPT | Performed by: INTERNAL MEDICINE

## 2018-10-24 PROCEDURE — 36415 COLL VENOUS BLD VENIPUNCTURE: CPT | Performed by: INTERNAL MEDICINE

## 2018-10-24 PROCEDURE — 99214 OFFICE O/P EST MOD 30 MIN: CPT | Performed by: INTERNAL MEDICINE

## 2018-10-24 PROCEDURE — 82043 UR ALBUMIN QUANTITATIVE: CPT | Performed by: INTERNAL MEDICINE

## 2018-10-24 PROCEDURE — 80061 LIPID PANEL: CPT | Performed by: INTERNAL MEDICINE

## 2018-10-24 NOTE — NURSING NOTE
"Chief Complaint   Patient presents with     Diabetes     initial /66  Pulse 68  Temp 98.3  F (36.8  C) (Oral)  Resp 18  Ht 5' 9.5\" (1.765 m)  Wt 179 lb (81.2 kg)  SpO2 99%  BMI 26.05 kg/m2 Estimated body mass index is 26.05 kg/(m^2) as calculated from the following:    Height as of this encounter: 5' 9.5\" (1.765 m).    Weight as of this encounter: 179 lb (81.2 kg)..  bp completed using cuff size large  GLENN VELEZ LPN  "

## 2018-10-24 NOTE — PROGRESS NOTES
ASSESSMENT/PLAN:     1. Type 2 diabetes mellitus without complication, without long-term current use of insulin (H)    Patient on janumet and HgbA1c returned at 4.5% and BG at 69. Per review of previous chart notes, patient never wants to go back on metformin alone. Since A1c on lower side I will recommend he go to a single agent, even januvia alone or reduce dose    Eye referral given today  Foot exam done  LDL at goal,no statin indicated  No albuminuria, no ACEI indicated      - SitaGLIPtin-MetFORMIN HCl (JANUMET XR)  MG TB24; Take 1 tablet by mouth 2 times daily  Dispense: 180 tablet; Refill: 3  - Hemoglobin A1c  - OPHTHALMOLOGY ADULT REFERRAL  - Albumin Random Urine Quantitative with Creat Ratio  - Comprehensive metabolic panel  - Lipid panel reflex to direct LDL Fasting      Edgardo Kim MD  [facility]        SUBJECTIVE:   Gt Fregoso is a 43 year old male who presents to clinic today for the following health issues:    Diabetes Follow-up    Patient is checking blood sugars: twice daily.    Blood sugar testing frequency justification:   Results are as follows:         am - 100    Diabetic concerns: None     Symptoms of hypoglycemia (low blood sugar): none     Paresthesias (numbness or burning in feet) or sores: No     Date of last diabetic eye exam: pt needs referral      BP Readings from Last 2 Encounters:   01/05/18 120/78   03/24/17 118/78     Hemoglobin A1C (%)   Date Value   01/05/2018 5.2   02/24/2017 5.9     LDL Cholesterol Calculated (mg/dL)   Date Value   01/05/2018 74   02/24/2017 89       Diabetes Management Resources    Amount of exercise or physical activity: walks    Problems taking medications regularly: No    Medication side effects: none    Diet: carbohydrate counting          Problem list and histories reviewed & adjusted, as indicated.  Additional history: as documented    Patient Active Problem List   Diagnosis     PUD (Peptic Ulcer Disease)-hx of     GERD  "(gastroesophageal reflux disease)     Helicobacter pylori infection     Positive PPD     Anemia     CARDIOVASCULAR SCREENING; LDL GOAL LESS THAN 160     Type 2 diabetes mellitus without complication (H)     Past Surgical History:   Procedure Laterality Date     C NONSPECIFIC PROCEDURE      none     COLONOSCOPY  2009       Social History   Substance Use Topics     Smoking status: Never Smoker     Smokeless tobacco: Never Used     Alcohol use No     Family History   Problem Relation Age of Onset     Diabetes Mother      Hypertension Father            Reviewed and updated as needed this visit by clinical staff  Tobacco  Med Hx  Surg Hx  Fam Hx  Soc Hx      Reviewed and updated as needed this visit by Provider         ROS:  Constitutional, HEENT, cardiovascular, pulmonary, gi and gu systems are negative, except as otherwise noted.    OBJECTIVE:     /66  Pulse 68  Temp 98.3  F (36.8  C) (Oral)  Resp 18  Ht 5' 9.5\" (1.765 m)  Wt 179 lb (81.2 kg)  SpO2 99%  BMI 26.05 kg/m2  Body mass index is 26.05 kg/(m^2).  GENERAL: healthy, alert and no distress  NECK: no adenopathy, no asymmetry, masses, or scars and thyroid normal to palpation  RESP: lungs clear to auscultation - no rales, rhonchi or wheezes  CV: regular rate and rhythm, normal S1 S2, no S3 or S4, no murmur, click or rub, no peripheral edema and peripheral pulses strong  ABDOMEN: soft, nontender, no hepatosplenomegaly, no masses and bowel sounds normal  MS: no gross musculoskeletal defects noted, no edema  Diabetic foot exam: normal DP and PT pulses, no trophic changes or ulcerative lesions and normal sensory exam    Diagnostic Test Results:  none       "

## 2018-10-24 NOTE — MR AVS SNAPSHOT
After Visit Summary   10/24/2018    Gt Fregoso    MRN: [id number]           Patient Information     Date Of Birth          1975        Visit Information        Provider Department      10/24/2018 1:20 PM Edgardo Kim MD [facility]        Today's Diagnoses     Type 2 diabetes mellitus without complication, without long-term current use of insulin (H)           Follow-ups after your visit        Additional Services     OPHTHALMOLOGY ADULT REFERRAL       Your provider has referred you to: N: [facility]  [phone]  [email or web address]    Please be aware that coverage of these services is subject to the terms and limitations of your health insurance plan.  Call member services at your health plan with any benefit or coverage questions.      Please bring the following with you to your appointment:    (1) Any X-Rays, CTs or MRIs which have been performed.  Contact the facility where they were done to arrange for  prior to your scheduled appointment.    (2) List of current medications  (3) This referral request   (4) Any documents/labs given to you for this referral                  Who to contact     If you have questions or need follow up information about today's clinic visit or your schedule please contact [facility] directly at [phone].  Normal or non-critical lab and imaging results will be communicated to you by MyChart, letter or phone within 4 business days after the clinic has received the results. If you do not hear from us within 7 days, please contact the clinic through MyChart or phone. If you have a critical or abnormal lab result, we will notify you by phone as soon as possible.  Submit refill requests through [employer] or call your pharmacy and they will forward the refill request to us. Please allow 3 business days for your refill to be completed.          Additional Information About  "Your Visit        Care EveryWhere ID     This is your Care EveryWhere ID. This could be used by other organizations to access your [place] medical records  [id number]        Your Vitals Were     Pulse Temperature Respirations Height Pulse Oximetry BMI (Body Mass Index)    68 98.3  F (36.8  C) (Oral) 18 5' 9.5\" (1.765 m) 99% 26.05 kg/m2       Blood Pressure from Last 3 Encounters:   10/24/18 116/66   01/05/18 120/78   03/24/17 118/78    Weight from Last 3 Encounters:   10/24/18 179 lb (81.2 kg)   01/05/18 192 lb (87.1 kg)   03/24/17 185 lb 1.6 oz (84 kg)              We Performed the Following     Albumin Random Urine Quantitative with Creat Ratio     Comprehensive metabolic panel     Hemoglobin A1c     Lipid panel reflex to direct LDL Fasting     OPHTHALMOLOGY ADULT REFERRAL          Where to get your medicines      These medications were sent to [facility] [address]  [id number] [place] [address]     Phone:  [phone]     SitaGLIPtin-MetFORMIN HCl  MG Tb24          Primary Care Provider Office Phone # Fax #    El Simmons [phone]       [address] [facility] [address]        Equal Access to Services     JONATHON FLOOD AH: Hadii aad ku hadasho Soomaali, waaxda luqadaha, qaybta kaalmada adeegyada, waxay idiin hayaan malick gaona. So [facility] [phone].    ATENCIÓN: Si habla español, tiene a barrios disposición servicios gratuitos de asistencia lingüística. Llame al [phone].    We comply with applicable federal civil rights laws and [place] laws. We do not discriminate on the basis of race, color, national origin, age, disability, sex, sexual orientation, or gender identity.            Thank you!     Thank you for choosing [facility]  for your care. Our goal is always to provide you with excellent care. Hearing back from our patients is one way we can continue to improve our services. " Please take a few minutes to complete the written survey that you may receive in the mail after your visit with us. Thank you!             Your Updated Medication List - Protect others around you: Learn how to safely use, store and throw away your medicines at www.disposemymeds.org.          This list is accurate as of 10/24/18  2:20 PM.  Always use your most recent med list.                   Brand Name Dispense Instructions for use Diagnosis    * ACE NOT PRESCRIBED (INTENTIONAL)     0 each    1 each daily ACE Inhibitor not prescribed due to Other:not needed    Diabetes mellitus, type 2 (H)       * ASPIRIN NOT PRESCRIBED    INTENTIONAL    0 each    1 each continuous prn. Antiplatelet medication not prescribed intentionally due to Intolerance and GI Issues / Ulcer Disease    Type 2 diabetes, HbA1C goal < 8% (H)       SitaGLIPtin-MetFORMIN HCl  MG Tb24    JANUMET XR    180 tablet    Take 1 tablet by mouth 2 times daily    Type 2 diabetes mellitus without complication, without long-term current use of insulin (H)       * STATIN NOT PRESCRIBED (INTENTIONAL)     0 each    Statin not prescribed intentionally due to Other:not needed        * Notice:  This list has 3 medication(s) that are the same as other medications prescribed for you. Read the directions carefully, and ask your doctor or other care provider to review them with you.

## 2018-10-25 ENCOUNTER — TELEPHONE (OUTPATIENT)
Dept: INTERNAL MEDICINE | Facility: CLINIC | Age: 43
End: 2018-10-25

## 2018-10-25 DIAGNOSIS — E11.9 TYPE 2 DIABETES MELLITUS WITHOUT COMPLICATION, WITHOUT LONG-TERM CURRENT USE OF INSULIN (H): ICD-10-CM

## 2018-10-25 LAB
ALBUMIN SERPL-MCNC: 4.3 G/DL (ref 3.4–5)
ALP SERPL-CCNC: 45 U/L (ref 40–150)
ALT SERPL W P-5'-P-CCNC: 24 U/L (ref 0–70)
ANION GAP SERPL CALCULATED.3IONS-SCNC: 4 MMOL/L (ref 3–14)
AST SERPL W P-5'-P-CCNC: 25 U/L (ref 0–45)
BILIRUB SERPL-MCNC: 0.4 MG/DL (ref 0.2–1.3)
BUN SERPL-MCNC: 15 MG/DL (ref 7–30)
CALCIUM SERPL-MCNC: 9.5 MG/DL (ref 8.5–10.1)
CHLORIDE SERPL-SCNC: 104 MMOL/L (ref 94–109)
CHOLEST SERPL-MCNC: 100 MG/DL
CO2 SERPL-SCNC: 33 MMOL/L (ref 20–32)
CREAT SERPL-MCNC: 0.91 MG/DL (ref 0.66–1.25)
CREAT UR-MCNC: 50 MG/DL
GFR SERPL CREATININE-BSD FRML MDRD: >90 ML/MIN/1.7M2
GLUCOSE SERPL-MCNC: 69 MG/DL (ref 70–99)
HDLC SERPL-MCNC: 40 MG/DL
LDLC SERPL CALC-MCNC: 45 MG/DL
MICROALBUMIN UR-MCNC: <5 MG/L
MICROALBUMIN/CREAT UR: NORMAL MG/G CR (ref 0–17)
NONHDLC SERPL-MCNC: 60 MG/DL
POTASSIUM SERPL-SCNC: 3.9 MMOL/L (ref 3.4–5.3)
PROT SERPL-MCNC: 8 G/DL (ref 6.8–8.8)
SODIUM SERPL-SCNC: 141 MMOL/L (ref 133–144)
TRIGL SERPL-MCNC: 75 MG/DL

## 2018-10-25 NOTE — TELEPHONE ENCOUNTER
Prior Authorization Retail Medication Request    Medication/Dose: Janumet XR 50-500mg  ICD code (if different than what is on RX):    Previously Tried and Failed:   Rationale:     Insurance Name:  [employer]  [phone]   Insurance ID:  [id number]      Pharmacy Information (if different than what is on RX)  Name:  [facility]   Phone:  [phone]    Thank you,  Hayley Barragan [facility]  [phone]

## 2018-10-26 ENCOUNTER — TELEPHONE (OUTPATIENT)
Dept: INTERNAL MEDICINE | Facility: CLINIC | Age: 43
End: 2018-10-26

## 2018-10-26 DIAGNOSIS — E11.9 TYPE 2 DIABETES MELLITUS WITHOUT COMPLICATION, WITHOUT LONG-TERM CURRENT USE OF INSULIN (H): ICD-10-CM

## 2018-10-26 NOTE — TELEPHONE ENCOUNTER
PA Initiation    Medication: JANUMET XR 50-500MG  Insurance Company: [employer] - Phone [phone] Fax [phone]  Pharmacy Filling the Rx: [id number] [place], [place] - [address] [facility] [address]  Filling Pharmacy Phone: [phone]  Filling Pharmacy Fax:    Start Date: 10/26/2018

## 2018-10-26 NOTE — TELEPHONE ENCOUNTER
Patient calls back stating someone tried contacting him regarding results. Informed patient of lab result note:    Notes Recorded by Edgardo Kim MD on 10/25/2018 at 3:39 PM  Please call with results.     His A1c has decreased to 4.5%    At this point, I recommend he either decrease dose of Janumet to once daily or switch to Januvia alone (He does not want to be on metformin alone).  The A1c is lower and I worry about hypoglycemia so this is why I recommend the change.       Patient states he would like to reduce the Janumet dosing, as he's tried other diabetic medication and had lots of side effects from other medications.     Provider please review and advise. Thanks.

## 2018-10-29 NOTE — TELEPHONE ENCOUNTER
PA Initiation    Medication: JANUMET XR 50-500MG - APPROVED  Insurance Company: [employer] - Phone [phone] Fax [phone]  Pharm[facility]illing the Rx[facility] [id number] [place], [place] - [address] [place] [address] [address]  Filling Pharmacy Phone: [phone]  Filling Pharmacy Fax:    Start Date: 10/26/2018

## 2018-11-06 ENCOUNTER — TRANSFERRED RECORDS (OUTPATIENT)
Dept: HEALTH INFORMATION MANAGEMENT | Facility: CLINIC | Age: 43
End: 2018-11-06

## 2018-11-30 DIAGNOSIS — E11.9 TYPE 2 DIABETES MELLITUS WITHOUT COMPLICATION, WITHOUT LONG-TERM CURRENT USE OF INSULIN (H): ICD-10-CM

## 2018-11-30 RX ORDER — SITAGLIPTIN AND METFORMIN HYDROCHLORIDE 50; 500 MG/1; MG/1
TABLET, FILM COATED, EXTENDED RELEASE ORAL
Qty: 180 TABLET | Refills: 1 | Status: SHIPPED | OUTPATIENT
Start: 2018-11-30 | End: 2021-05-17

## 2018-11-30 NOTE — TELEPHONE ENCOUNTER
"Requested Prescriptions   Pending Prescriptions Disp Refills     JANUMET XR  MG TB24 [Pharmacy Med Name: JANUMET XR 50-500MG TB24] 180 tablet 3    Last Written Prescription Date:  10/26/2018  Last Fill Quantity: 90,  # refills: 3   Last office visit: 10/24/2018 with prescribing provider:     Future Office Visit:   Sig: TAKE ONE TABLET BY MOUTH TWICE A DAY    Combination Oral Antihyperglycemic Agents Passed    11/30/2018  1:03 PM       Passed - Blood pressure under 140/90 in past 12 months    BP Readings from Last 3 Encounters:   10/24/18 116/66   01/05/18 120/78   03/24/17 118/78                Passed - Patient has a documented LDL level within past 12 mos.    Recent Labs   Lab Test  10/24/18   1418   LDL  45            Passed - Patient has a documented Microalbumin level within past 12 mos.    Recent Labs   Lab Test  10/24/18   1419   MICROL  <5   UMALCR  Unable to calculate due to low value            Passed - Patient has documented A1c within the specified period of time.    If HgbA1C is 8 or greater, it needs to be on file within the past 3 months.  If less than 8, must be on file within the past 6 months.     Recent Labs   Lab Test  10/24/18   1418   A1C  4.5            Passed - Patient's CR is NOT>1.4 OR Patient's EGFR is NOT<45 within past 12 mos.    Recent Labs   Lab Test  10/24/18   1418   GFRESTIMATED  >90   GFRESTBLACK  >90       Recent Labs   Lab Test  10/24/18   1418   CR  0.91            Passed - Patient does not have a diagnosis of CHF.       Passed - Patient is 18 years old or older.       Passed - Recent (6 mo) or future (30 days) visit within the authorizing provider's specialty    Patient had office visit in the last 6 months or has a visit in the next 30 days with authorizing provider or within the authorizing provider's specialty.  See \"Patient Info\" tab in inbasket, or \"Choose Columns\" in Meds & Orders section of the refill encounter.            "

## 2018-12-22 ENCOUNTER — OFFICE VISIT (OUTPATIENT)
Dept: URGENT CARE | Facility: URGENT CARE | Age: 43
End: 2018-12-22
Payer: COMMERCIAL

## 2018-12-22 VITALS
WEIGHT: 185 LBS | OXYGEN SATURATION: 99 % | HEART RATE: 86 BPM | TEMPERATURE: 97.6 F | BODY MASS INDEX: 26.93 KG/M2 | SYSTOLIC BLOOD PRESSURE: 129 MMHG | DIASTOLIC BLOOD PRESSURE: 87 MMHG

## 2018-12-22 DIAGNOSIS — J02.9 SORE THROAT: ICD-10-CM

## 2018-12-22 DIAGNOSIS — E11.8 TYPE 2 DIABETES MELLITUS WITH COMPLICATION, WITHOUT LONG-TERM CURRENT USE OF INSULIN (H): ICD-10-CM

## 2018-12-22 DIAGNOSIS — J01.90 ACUTE SINUSITIS WITH SYMPTOMS > 10 DAYS: Primary | ICD-10-CM

## 2018-12-22 LAB
DEPRECATED S PYO AG THROAT QL EIA: NORMAL
SPECIMEN SOURCE: NORMAL

## 2018-12-22 PROCEDURE — 99214 OFFICE O/P EST MOD 30 MIN: CPT | Performed by: PHYSICIAN ASSISTANT

## 2018-12-22 PROCEDURE — 87880 STREP A ASSAY W/OPTIC: CPT | Performed by: FAMILY MEDICINE

## 2018-12-22 PROCEDURE — 87081 CULTURE SCREEN ONLY: CPT | Performed by: FAMILY MEDICINE

## 2018-12-22 RX ORDER — AMOXICILLIN 875 MG
875 TABLET ORAL 2 TIMES DAILY
Qty: 20 TABLET | Refills: 0 | Status: SHIPPED | OUTPATIENT
Start: 2018-12-22 | End: 2019-01-01

## 2018-12-23 LAB
BACTERIA SPEC CULT: NORMAL
SPECIMEN SOURCE: NORMAL

## 2018-12-23 NOTE — PROGRESS NOTES
SUBJECTIVE:   Gt Fregoso is a 43 y.o. Man, with a pmhx that includes DM, who presents to clinic today for evaluation of ST,  nasal congestion, purulent rhinorrhea, post-nasal drainage,on/off bilateral ear pressure, waxing and waning sinus HA and a waxing and waning cough  x 1+  weeks duration with interval worsening 6 days ago. No fever.     ROS:     HEENT: Positive nasal and sinus congestion.   RESP: Positive cough as per above. Despite cough, denies any severe SOB.  Denies any hx of asthma or RAD.   GI: Denies any N/V/D. No abdominal pain. Normal BM's  SKIN: Denies rash  NEURO: Positive for waxing and waning sinus HA. Denies any sever headaches, neck stiffness, photophobia, rash, mental status changes or lethargy.   DM: Positive for hx of DM. Denies any acute high or low swings in home BS readings since onset of acute illness sxs     Illness Contact: Multiple family members with bacterial URI being tx with abx     Past Medical History:   Diagnosis Date     Diabetes mellitus type II, controlled (H) 2013    Unremarkable       Patient Active Problem List   Diagnosis     PUD (Peptic Ulcer Disease)-hx of     GERD (gastroesophageal reflux disease)     Helicobacter pylori infection     Positive PPD     Anemia     CARDIOVASCULAR SCREENING; LDL GOAL LESS THAN 160     Type 2 diabetes mellitus without complication (H)       Current Outpatient Medications   Medication     ACE NOT PRESCRIBED, INTENTIONAL,     ASPIRIN NOT PRESCRIBED, INTENTIONAL,     JANUMET XR  MG TB24     SitaGLIPtin-MetFORMIN HCl (JANUMET XR)  MG TB24     STATIN NOT PRESCRIBED, INTENTIONAL,     No current facility-administered medications for this visit.        No Known Allergies        OBJECTIVE:   /87   Pulse 86   Temp 97.6  F (36.4  C) (Tympanic)   Wt 83.9 kg (185 lb)   SpO2 99%   BMI 26.93 kg/m          General appearance: alert and no apparent distress   Skin color is uniform in color and without rash.   HEENT:  "  Conjunctiva not injected. Sclera clear.   Left TM is normal: no effusions, no erythema, and normal landmarks.   Right TM is normal: no effusions, no erythema, and normal landmarks.   Nasal mucosa is red and swollen. Maxillary sinuses are mildly tender to percussion bilaterally.   Oropharyngeal exam is positive for mild, diffuse, erythema.  No plaque, exudate, lesions, or ulcers.    Neck is supple, FROM with no adenopathy   CARDIAC:NORMAL - regular rate and rhythm without murmur.   RESP: Normal - CTA without rales, rhonchi, or wheezing.   NEURO: Alert and oriented.  Normal speech and mentation.  CN II/XII grossly intact.  Gait within normal limits.      Results for orders placed or performed in visit on 12/22/18   Strep, Rapid Screen   Result Value Ref Range    Specimen Description Throat     Rapid Strep A Screen       NEGATIVE: No Group A streptococcal antigen detected by immunoassay, await culture report.         ASSESSMENT/PLAN:    (J01.90) Acute sinusitis with symptoms > 10 days  (primary encounter diagnosis)  MDM: Prolonged nasal/sinus sxs, with interval worsening, in a 43 y.o. Man with co-existing hx of DM and multiple household bacterial URI contact exposure. Favor bacterial etiology. DM also confers some increased risk due to associated disease related immunosuppression. Advised abx and follow-up if no improvement or if any sudden, acute worsening. See plan below.     Plan: amoxicillin (AMOXIL) 875 MG tablet      1. Abx as per above   2. Advised to watch home BS carefully during acute illness as some acute illness can cause high and low swings in BS. Continue taking all DM medications as prescribed by PCP.   3. Home comfort care measures reviewed   4. Follow-up with PCP if sxs change, worsen or fail to fully resolve with above tx.  5.  In addition to the above, sinusitis  \"red flag\" signs and sxs are reviewed with pt both verbally and by way of printed educational material for home review.  Pt verbalizes " understanding of and agrees to the above plan.       (E11.8) Type 2 diabetes mellitus with complication, without long-term current use of insulin (H)  As above     (J02.9) Sore throat  Plan: Strep, Rapid Screen, Beta strep group A culture  Follow-up with PCP if sxs change, worsen or fail to fully resolve with above tx.

## 2021-05-17 ENCOUNTER — TELEPHONE (OUTPATIENT)
Dept: INTERNAL MEDICINE | Facility: CLINIC | Age: 46
End: 2021-05-17

## 2021-05-17 ENCOUNTER — OFFICE VISIT (OUTPATIENT)
Dept: INTERNAL MEDICINE | Facility: CLINIC | Age: 46
End: 2021-05-17
Payer: COMMERCIAL

## 2021-05-17 VITALS
TEMPERATURE: 98.3 F | DIASTOLIC BLOOD PRESSURE: 78 MMHG | BODY MASS INDEX: 26.94 KG/M2 | OXYGEN SATURATION: 97 % | WEIGHT: 188.2 LBS | HEIGHT: 70 IN | SYSTOLIC BLOOD PRESSURE: 138 MMHG | HEART RATE: 110 BPM | RESPIRATION RATE: 20 BRPM

## 2021-05-17 DIAGNOSIS — D64.9 ANEMIA, UNSPECIFIED TYPE: ICD-10-CM

## 2021-05-17 DIAGNOSIS — E11.9 TYPE 2 DIABETES MELLITUS WITHOUT COMPLICATION, WITHOUT LONG-TERM CURRENT USE OF INSULIN (H): Primary | ICD-10-CM

## 2021-05-17 LAB
ALBUMIN SERPL-MCNC: 3.9 G/DL (ref 3.4–5)
ALP SERPL-CCNC: 58 U/L (ref 40–150)
ALT SERPL W P-5'-P-CCNC: 28 U/L (ref 0–70)
ANION GAP SERPL CALCULATED.3IONS-SCNC: <1 MMOL/L (ref 3–14)
AST SERPL W P-5'-P-CCNC: 25 U/L (ref 0–45)
BASOPHILS # BLD AUTO: 0 10E9/L (ref 0–0.2)
BASOPHILS NFR BLD AUTO: 0.4 %
BILIRUB SERPL-MCNC: 0.3 MG/DL (ref 0.2–1.3)
BUN SERPL-MCNC: 17 MG/DL (ref 7–30)
CALCIUM SERPL-MCNC: 9 MG/DL (ref 8.5–10.1)
CHLORIDE SERPL-SCNC: 108 MMOL/L (ref 94–109)
CHOLEST SERPL-MCNC: 121 MG/DL
CO2 SERPL-SCNC: 33 MMOL/L (ref 20–32)
CREAT SERPL-MCNC: 1.13 MG/DL (ref 0.66–1.25)
DIFFERENTIAL METHOD BLD: ABNORMAL
EOSINOPHIL # BLD AUTO: 0 10E9/L (ref 0–0.7)
EOSINOPHIL NFR BLD AUTO: 0.4 %
ERYTHROCYTE [DISTWIDTH] IN BLOOD BY AUTOMATED COUNT: 13.6 % (ref 10–15)
GFR SERPL CREATININE-BSD FRML MDRD: 77 ML/MIN/{1.73_M2}
GLUCOSE SERPL-MCNC: 81 MG/DL (ref 70–99)
HBA1C MFR BLD: 6 % (ref 0–5.6)
HCT VFR BLD AUTO: 36.9 % (ref 40–53)
HDLC SERPL-MCNC: 42 MG/DL
HGB BLD-MCNC: 12.1 G/DL (ref 13.3–17.7)
LDLC SERPL CALC-MCNC: 65 MG/DL
LYMPHOCYTES # BLD AUTO: 1.6 10E9/L (ref 0.8–5.3)
LYMPHOCYTES NFR BLD AUTO: 30.9 %
MCH RBC QN AUTO: 26.5 PG (ref 26.5–33)
MCHC RBC AUTO-ENTMCNC: 32.8 G/DL (ref 31.5–36.5)
MCV RBC AUTO: 81 FL (ref 78–100)
MONOCYTES # BLD AUTO: 0.4 10E9/L (ref 0–1.3)
MONOCYTES NFR BLD AUTO: 8.8 %
NEUTROPHILS # BLD AUTO: 3 10E9/L (ref 1.6–8.3)
NEUTROPHILS NFR BLD AUTO: 59.5 %
NONHDLC SERPL-MCNC: 79 MG/DL
PLATELET # BLD AUTO: 147 10E9/L (ref 150–450)
POTASSIUM SERPL-SCNC: 3.9 MMOL/L (ref 3.4–5.3)
PROT SERPL-MCNC: 7.4 G/DL (ref 6.8–8.8)
RBC # BLD AUTO: 4.56 10E12/L (ref 4.4–5.9)
SODIUM SERPL-SCNC: 141 MMOL/L (ref 133–144)
TRIGL SERPL-MCNC: 72 MG/DL
TSH SERPL DL<=0.005 MIU/L-ACNC: 2.27 MU/L (ref 0.4–4)
WBC # BLD AUTO: 5 10E9/L (ref 4–11)

## 2021-05-17 PROCEDURE — 83036 HEMOGLOBIN GLYCOSYLATED A1C: CPT | Performed by: INTERNAL MEDICINE

## 2021-05-17 PROCEDURE — 82043 UR ALBUMIN QUANTITATIVE: CPT | Performed by: INTERNAL MEDICINE

## 2021-05-17 PROCEDURE — 80061 LIPID PANEL: CPT | Performed by: INTERNAL MEDICINE

## 2021-05-17 PROCEDURE — 36415 COLL VENOUS BLD VENIPUNCTURE: CPT | Performed by: INTERNAL MEDICINE

## 2021-05-17 PROCEDURE — 99214 OFFICE O/P EST MOD 30 MIN: CPT | Performed by: INTERNAL MEDICINE

## 2021-05-17 PROCEDURE — 80050 GENERAL HEALTH PANEL: CPT | Performed by: INTERNAL MEDICINE

## 2021-05-17 RX ORDER — SITAGLIPTIN AND METFORMIN HYDROCHLORIDE 50; 500 MG/1; MG/1
1 TABLET, FILM COATED, EXTENDED RELEASE ORAL 2 TIMES DAILY
Qty: 180 TABLET | Refills: 1 | Status: SHIPPED | OUTPATIENT
Start: 2021-05-17

## 2021-05-17 ASSESSMENT — MIFFLIN-ST. JEOR: SCORE: 1731.98

## 2021-05-17 NOTE — PROGRESS NOTES
"    Assessment & Plan     Type 2 diabetes mellitus without complication, without long-term current use of insulin (H)  Assess lab  Cont treatment   - SitaGLIPtin-MetFORMIN HCl (JANUMET XR)  MG TB24; Take 1 tablet by mouth 2 times daily  - Comprehensive metabolic panel  - Lipid panel reflex to direct LDL Non-fasting  - TSH with free T4 reflex  - Hemoglobin A1c  - Albumin Random Urine Quantitative with Creat Ratio  - CBC with platelets and differential    Anemia, unspecified type  Assess lab work   - CBC with platelets and differential             BMI:   Estimated body mass index is 27.39 kg/m  as calculated from the following:    Height as of this encounter: 1.765 m (5' 9.5\").    Weight as of this encounter: 85.4 kg (188 lb 3.2 oz).   Weight management plan: Discussed healthy diet and exercise guidelines    See Patient Instructions    No follow-ups on file.    El Simmons MD  [facility] EDUAR Sandoval is a 45 year old who presents for the following health issues  accompanied by himself:    HPI     Diabetes Follow-up  Has H/O DM. On diet , exercise and oral treatment . Blood sugars are controlled. No parestesias. No hypoglycemias.    How often are you checking your blood sugar? Two times daily  Blood sugar testing frequency justification:  Uncontrolled diabetes  What time of day are you checking your blood sugars (select all that apply)?  Before meals  Have you had any blood sugars above 200?  No  Have you had any blood sugars below 70?  No    What symptoms do you notice when your blood sugar is low?  None    What concerns do you have today about your diabetes? None     Do you have any of these symptoms? (Select all that apply)  No numbness or tingling in feet.  No redness, sores or blisters on feet.  No complaints of excessive thirst.  No reports of blurry vision.  No significant changes to weight.    Have you had a diabetic eye exam in the last 12 months? No        BP " Readings from Last 2 Encounters:   12/22/18 129/87   10/24/18 116/66     Hemoglobin A1C (%)   Date Value   10/24/2018 4.5   01/05/2018 5.2     LDL Cholesterol Calculated (mg/dL)   Date Value   10/24/2018 45   01/05/2018 74         Hyperlipidemia Follow-Up      Are you regularly taking any medication or supplement to lower your cholesterol?   No    Are you having muscle aches or other side effects that you think could be caused by your cholesterol lowering medication?  No      How many servings of fruits and vegetables do you eat daily?  2-3    On average, how many sweetened beverages do you drink each day (Examples: soda, juice, sweet tea, etc.  Do NOT count diet or artificially sweetened beverages)?   0    How many days per week do you exercise enough to make your heart beat faster? 7    How many minutes a day do you exercise enough to make your heart beat faster? 60 or more    How many days per week do you miss taking your medication? 0    Has had mild anemia, low iron, needs recheck after taking supplements.     Review of Systems   Constitutional, HEENT, cardiovascular, pulmonary, gi and gu systems are negative, except as otherwise noted.      Objective    There were no vitals taken for this visit.  There is no height or weight on file to calculate BMI.  Physical Exam   GENERAL: healthy, alert and no distress  EYES: Eyes grossly normal to inspection, PERRL and conjunctivae and sclerae normal  HENT: ear canals and TM's normal, nose and mouth without ulcers or lesions  NECK: no adenopathy, no asymmetry, masses, or scars and thyroid normal to palpation  RESP: lungs clear to auscultation - no rales, rhonchi or wheezes  CV: regular rate and rhythm, normal S1 S2, no S3 or S4, no murmur, click or rub, no peripheral edema and peripheral pulses strong  ABDOMEN: soft, nontender, no hepatosplenomegaly, no masses and bowel sounds normal  MS: no gross musculoskeletal defects noted, no edema  SKIN: no suspicious lesions or  rashes  NEURO: Normal strength and tone, mentation intact and speech normal    Office Visit on 12/22/2018   Component Date Value Ref Range Status     Specimen Description 12/22/2018 Throat   Final     Rapid Strep A Screen 12/22/2018 NEGATIVE: No Group A streptococcal antigen detected by immunoassay, await culture report.   Final     Specimen Description 12/22/2018 Throat   Final     Culture Micro 12/22/2018 No beta hemolytic Streptococcus Group A isolated   Final

## 2021-05-17 NOTE — TELEPHONE ENCOUNTER
Prior Authorization Retail Medication Request    Medication/Dose: SitaGLIPtin-MetFORMIN HCl (JANUMET XR)  MG TB24  ICD code (if different than what is on RX):    Previously Tried and Failed:    Rationale:      Insurance Name:  [employer]  Insurance ID:  [id number]  OBTAIN PA USING PA Phone # [phone] or TP Help Desk Phone:[phone].      Pharmacy Information (if different than what is on RX)  Name:  [id number]   Phone:  [phone]

## 2021-05-18 LAB
CREAT UR-MCNC: 168 MG/DL
MICROALBUMIN UR-MCNC: 6 MG/L
MICROALBUMIN/CREAT UR: 3.61 MG/G CR (ref 0–17)

## 2021-05-18 NOTE — TELEPHONE ENCOUNTER
Prior Authorization Approval    Authorization Effective Date: 5/18/2021  Authorization Expiration Date: 5/17/2024  Medication: SitaGLIPtin-MetFORMIN HCl (JANUMET XR)  MG TB24- APPROVED  Approved Dose/Quantity: 180 TABS/90 DAYS FOR THIS FILL. NEXT RETAIL FILL AND AFTER ONLY 30 DAY SUPPLY ALLOWED OR MAIL ORDER FOR 90 DAY SUPPLY.   Reference #: [id number]   Insurance Company: [employer] - Phone [phone] Fax [phone]  Expected CoPay:       CoPay Card Available:      Foundation Assistance Needed:    Which Pharmacy is filling the prescription (Not needed for infusion/clinic administered): [facility] [address] [place], [place] - [address] [place] [address]  Pharmacy Notified: Yes  Patient Notified: Yes          Central Prior Authorization Team  Phone: [phone]

## 2021-05-18 NOTE — TELEPHONE ENCOUNTER
PA Initiation    Medication: SitaGLIPtin-MetFORMIN HCl (JANUMET XR)  MG TB24  Insurance Company: [employer] - Phone [phone] Fax [phone]  Pharmacy Filling the Rx: [id number] [place] [facility] [place] - [address] [place] [address]  Filling Pharmacy Phone: [phone]  Filling Pharmacy Fax: [phone]  Start Date: 5/18/2021

## 2022-01-15 ENCOUNTER — HEALTH MAINTENANCE LETTER (OUTPATIENT)
Age: 47
End: 2022-01-15

## 2022-08-21 ENCOUNTER — HEALTH MAINTENANCE LETTER (OUTPATIENT)
Age: 47
End: 2022-08-21

## 2022-12-26 ENCOUNTER — HEALTH MAINTENANCE LETTER (OUTPATIENT)
Age: 47
End: 2022-12-26

## 2023-04-16 ENCOUNTER — HEALTH MAINTENANCE LETTER (OUTPATIENT)
Age: 48
End: 2023-04-16

## 2023-11-26 ENCOUNTER — HEALTH MAINTENANCE LETTER (OUTPATIENT)
Age: 48
End: 2023-11-26